# Patient Record
Sex: FEMALE | Race: WHITE | URBAN - METROPOLITAN AREA
[De-identification: names, ages, dates, MRNs, and addresses within clinical notes are randomized per-mention and may not be internally consistent; named-entity substitution may affect disease eponyms.]

---

## 2023-08-21 ENCOUNTER — HOSPITAL ENCOUNTER (OUTPATIENT)
Age: 28
Discharge: HOME | End: 2023-08-21
Attending: OBSTETRICS & GYNECOLOGY | Admitting: OBSTETRICS & GYNECOLOGY

## 2023-08-21 VITALS
DIASTOLIC BLOOD PRESSURE: 87 MMHG | BODY MASS INDEX: 25.44 KG/M2 | SYSTOLIC BLOOD PRESSURE: 133 MMHG | HEART RATE: 70 BPM | RESPIRATION RATE: 18 BRPM | WEIGHT: 149 LBS | TEMPERATURE: 98.3 F | HEIGHT: 64 IN

## 2023-08-21 PROCEDURE — 99284 EMERGENCY DEPT VISIT MOD MDM: CPT

## 2023-08-21 PROCEDURE — 59025 FETAL NON-STRESS TEST: CPT

## 2023-08-28 ENCOUNTER — HOSPITAL ENCOUNTER (INPATIENT)
Age: 28
LOS: 2 days | Discharge: HOME | End: 2023-08-30
Attending: OBSTETRICS & GYNECOLOGY | Admitting: OBSTETRICS & GYNECOLOGY
Payer: COMMERCIAL

## 2023-08-28 ENCOUNTER — ANESTHESIA EVENT (OUTPATIENT)
Dept: LABOR AND DELIVERY | Age: 28
End: 2023-08-28
Payer: COMMERCIAL

## 2023-08-28 ENCOUNTER — APPOINTMENT (OUTPATIENT)
Dept: LABOR AND DELIVERY | Age: 28
End: 2023-08-28
Payer: COMMERCIAL

## 2023-08-28 ENCOUNTER — ANESTHESIA (OUTPATIENT)
Dept: LABOR AND DELIVERY | Age: 28
End: 2023-08-28
Payer: COMMERCIAL

## 2023-08-28 PROBLEM — Z34.90 ENCOUNTER FOR INDUCTION OF LABOR: Status: ACTIVE | Noted: 2023-08-28

## 2023-08-28 LAB
ABO + RH BLD: NORMAL
BASE DEFICIT BLD-SCNC: 10.1 MMOL/L
BASE DEFICIT BLD-SCNC: 5.6 MMOL/L
BLOOD GROUP ANTIBODIES SERPL: NORMAL
ERYTHROCYTE [DISTWIDTH] IN BLOOD BY AUTOMATED COUNT: 12.7 % (ref 11.9–14.6)
HCO3 BLD-SCNC: 19 MMOL/L (ref 22–26)
HCO3 BLDV-SCNC: 19.8 MMOL/L (ref 23–28)
HCT VFR BLD AUTO: 38.1 % (ref 35.8–46.3)
HGB BLD-MCNC: 12.8 G/DL (ref 11.7–15.4)
MCH RBC QN AUTO: 30.5 PG (ref 26.1–32.9)
MCHC RBC AUTO-ENTMCNC: 33.6 G/DL (ref 31.4–35)
MCV RBC AUTO: 90.7 FL (ref 82–102)
NRBC # BLD: 0 K/UL (ref 0–0.2)
PCO2 BLDCO: 38 MMHG (ref 32–68)
PCO2 BLDCO: 53 MMHG (ref 32–68)
PH BLDCO: 7.16 (ref 7.15–7.38)
PH BLDCO: 7.33 (ref 7.15–7.38)
PLATELET # BLD AUTO: 248 K/UL (ref 150–450)
PMV BLD AUTO: 11 FL (ref 9.4–12.3)
PO2 BLDCO: 27 MMHG
PO2 BLDCO: 35 MMHG
RBC # BLD AUTO: 4.2 M/UL (ref 4.05–5.2)
SAO2 % BLD: 34.4 % (ref 95–98)
SAO2 % BLDV: 62.3 % (ref 65–88)
SERVICE CMNT-IMP: ABNORMAL
SERVICE CMNT-IMP: ABNORMAL
SPECIMEN EXP DATE BLD: NORMAL
SPECIMEN TYPE: ABNORMAL
SPECIMEN TYPE: ABNORMAL
WBC # BLD AUTO: 9.6 K/UL (ref 4.3–11.1)

## 2023-08-28 PROCEDURE — 86901 BLOOD TYPING SEROLOGIC RH(D): CPT

## 2023-08-28 PROCEDURE — 0KQM0ZZ REPAIR PERINEUM MUSCLE, OPEN APPROACH: ICD-10-PCS | Performed by: OBSTETRICS & GYNECOLOGY

## 2023-08-28 PROCEDURE — 7100000011 HC PHASE II RECOVERY - ADDTL 15 MIN

## 2023-08-28 PROCEDURE — 82803 BLOOD GASES ANY COMBINATION: CPT

## 2023-08-28 PROCEDURE — 51701 INSERT BLADDER CATHETER: CPT

## 2023-08-28 PROCEDURE — 7210000100 HC LABOR FEE PER 1 HR

## 2023-08-28 PROCEDURE — 6370000000 HC RX 637 (ALT 250 FOR IP): Performed by: OBSTETRICS & GYNECOLOGY

## 2023-08-28 PROCEDURE — 2580000003 HC RX 258: Performed by: OBSTETRICS & GYNECOLOGY

## 2023-08-28 PROCEDURE — 7220000101 HC DELIVERY VAGINAL/SINGLE

## 2023-08-28 PROCEDURE — 99465 NB RESUSCITATION: CPT

## 2023-08-28 PROCEDURE — 3E033VJ INTRODUCTION OF OTHER HORMONE INTO PERIPHERAL VEIN, PERCUTANEOUS APPROACH: ICD-10-PCS | Performed by: OBSTETRICS & GYNECOLOGY

## 2023-08-28 PROCEDURE — 6360000002 HC RX W HCPCS: Performed by: NURSE ANESTHETIST, CERTIFIED REGISTERED

## 2023-08-28 PROCEDURE — 6360000002 HC RX W HCPCS: Performed by: OBSTETRICS & GYNECOLOGY

## 2023-08-28 PROCEDURE — 7100000010 HC PHASE II RECOVERY - FIRST 15 MIN

## 2023-08-28 PROCEDURE — 86850 RBC ANTIBODY SCREEN: CPT

## 2023-08-28 PROCEDURE — 1100000000 HC RM PRIVATE

## 2023-08-28 PROCEDURE — 85027 COMPLETE CBC AUTOMATED: CPT

## 2023-08-28 PROCEDURE — 36600 WITHDRAWAL OF ARTERIAL BLOOD: CPT

## 2023-08-28 PROCEDURE — 00HU33Z INSERTION OF INFUSION DEVICE INTO SPINAL CANAL, PERCUTANEOUS APPROACH: ICD-10-PCS | Performed by: ANESTHESIOLOGY

## 2023-08-28 PROCEDURE — 3700000025 EPIDURAL BLOCK: Performed by: ANESTHESIOLOGY

## 2023-08-28 PROCEDURE — 86900 BLOOD TYPING SEROLOGIC ABO: CPT

## 2023-08-28 PROCEDURE — 2500000003 HC RX 250 WO HCPCS: Performed by: NURSE ANESTHETIST, CERTIFIED REGISTERED

## 2023-08-28 PROCEDURE — 10907ZC DRAINAGE OF AMNIOTIC FLUID, THERAPEUTIC FROM PRODUCTS OF CONCEPTION, VIA NATURAL OR ARTIFICIAL OPENING: ICD-10-PCS | Performed by: OBSTETRICS & GYNECOLOGY

## 2023-08-28 RX ORDER — LANOLIN
CREAM (ML) TOPICAL PRN
Status: DISCONTINUED | OUTPATIENT
Start: 2023-08-28 | End: 2023-08-30 | Stop reason: HOSPADM

## 2023-08-28 RX ORDER — CARBOPROST TROMETHAMINE 250 UG/ML
250 INJECTION, SOLUTION INTRAMUSCULAR PRN
Status: DISCONTINUED | OUTPATIENT
Start: 2023-08-28 | End: 2023-08-28

## 2023-08-28 RX ORDER — DOCUSATE SODIUM 100 MG/1
100 CAPSULE, LIQUID FILLED ORAL 2 TIMES DAILY
Status: DISCONTINUED | OUTPATIENT
Start: 2023-08-28 | End: 2023-08-28

## 2023-08-28 RX ORDER — OXYCODONE HYDROCHLORIDE 5 MG/1
10 TABLET ORAL EVERY 4 HOURS PRN
Status: DISCONTINUED | OUTPATIENT
Start: 2023-08-28 | End: 2023-08-30 | Stop reason: HOSPADM

## 2023-08-28 RX ORDER — METHYLERGONOVINE MALEATE 0.2 MG/ML
200 INJECTION INTRAVENOUS PRN
Status: DISCONTINUED | OUTPATIENT
Start: 2023-08-28 | End: 2023-08-28

## 2023-08-28 RX ORDER — OXYCODONE HYDROCHLORIDE 5 MG/1
5 TABLET ORAL EVERY 4 HOURS PRN
Status: DISCONTINUED | OUTPATIENT
Start: 2023-08-28 | End: 2023-08-30 | Stop reason: HOSPADM

## 2023-08-28 RX ORDER — MISOPROSTOL 200 UG/1
800 TABLET ORAL PRN
Status: DISCONTINUED | OUTPATIENT
Start: 2023-08-28 | End: 2023-08-28

## 2023-08-28 RX ORDER — SODIUM CHLORIDE, SODIUM LACTATE, POTASSIUM CHLORIDE, AND CALCIUM CHLORIDE .6; .31; .03; .02 G/100ML; G/100ML; G/100ML; G/100ML
500 INJECTION, SOLUTION INTRAVENOUS PRN
Status: DISCONTINUED | OUTPATIENT
Start: 2023-08-28 | End: 2023-08-28

## 2023-08-28 RX ORDER — HYDROCORTISONE ACETATE PRAMOXINE HCL 2.5; 1 G/100G; G/100G
CREAM TOPICAL 3 TIMES DAILY
Status: DISCONTINUED | OUTPATIENT
Start: 2023-08-28 | End: 2023-08-30 | Stop reason: HOSPADM

## 2023-08-28 RX ORDER — TRANEXAMIC ACID 10 MG/ML
1000 INJECTION, SOLUTION INTRAVENOUS
Status: DISCONTINUED | OUTPATIENT
Start: 2023-08-28 | End: 2023-08-28

## 2023-08-28 RX ORDER — ONDANSETRON 2 MG/ML
4 INJECTION INTRAMUSCULAR; INTRAVENOUS EVERY 6 HOURS PRN
Status: DISCONTINUED | OUTPATIENT
Start: 2023-08-28 | End: 2023-08-30 | Stop reason: HOSPADM

## 2023-08-28 RX ORDER — ACETAMINOPHEN 500 MG
1000 TABLET ORAL EVERY 6 HOURS
Status: DISCONTINUED | OUTPATIENT
Start: 2023-08-28 | End: 2023-08-30 | Stop reason: HOSPADM

## 2023-08-28 RX ORDER — ONDANSETRON 2 MG/ML
4 INJECTION INTRAMUSCULAR; INTRAVENOUS EVERY 6 HOURS PRN
Status: DISCONTINUED | OUTPATIENT
Start: 2023-08-28 | End: 2023-08-28

## 2023-08-28 RX ORDER — FAMOTIDINE 20 MG/1
20 TABLET, FILM COATED ORAL 2 TIMES DAILY PRN
Status: DISCONTINUED | OUTPATIENT
Start: 2023-08-28 | End: 2023-08-28

## 2023-08-28 RX ORDER — SODIUM CHLORIDE 9 MG/ML
INJECTION, SOLUTION INTRAVENOUS PRN
Status: DISCONTINUED | OUTPATIENT
Start: 2023-08-28 | End: 2023-08-30 | Stop reason: HOSPADM

## 2023-08-28 RX ORDER — SODIUM CHLORIDE 0.9 % (FLUSH) 0.9 %
5-40 SYRINGE (ML) INJECTION EVERY 12 HOURS SCHEDULED
Status: DISCONTINUED | OUTPATIENT
Start: 2023-08-28 | End: 2023-08-28

## 2023-08-28 RX ORDER — SODIUM CHLORIDE, SODIUM LACTATE, POTASSIUM CHLORIDE, CALCIUM CHLORIDE 600; 310; 30; 20 MG/100ML; MG/100ML; MG/100ML; MG/100ML
INJECTION, SOLUTION INTRAVENOUS CONTINUOUS
Status: DISCONTINUED | OUTPATIENT
Start: 2023-08-28 | End: 2023-08-30 | Stop reason: HOSPADM

## 2023-08-28 RX ORDER — SODIUM CHLORIDE, SODIUM LACTATE, POTASSIUM CHLORIDE, CALCIUM CHLORIDE 600; 310; 30; 20 MG/100ML; MG/100ML; MG/100ML; MG/100ML
INJECTION, SOLUTION INTRAVENOUS CONTINUOUS
Status: DISCONTINUED | OUTPATIENT
Start: 2023-08-28 | End: 2023-08-28

## 2023-08-28 RX ORDER — LIDOCAINE HYDROCHLORIDE 20 MG/ML
INJECTION, SOLUTION EPIDURAL; INFILTRATION; INTRACAUDAL; PERINEURAL PRN
Status: DISCONTINUED | OUTPATIENT
Start: 2023-08-28 | End: 2023-08-28 | Stop reason: SDUPTHER

## 2023-08-28 RX ORDER — ROPIVACAINE HYDROCHLORIDE 2 MG/ML
INJECTION, SOLUTION EPIDURAL; INFILTRATION; PERINEURAL CONTINUOUS PRN
Status: DISCONTINUED | OUTPATIENT
Start: 2023-08-28 | End: 2023-08-28 | Stop reason: SDUPTHER

## 2023-08-28 RX ORDER — SODIUM CHLORIDE, SODIUM LACTATE, POTASSIUM CHLORIDE, AND CALCIUM CHLORIDE .6; .31; .03; .02 G/100ML; G/100ML; G/100ML; G/100ML
1000 INJECTION, SOLUTION INTRAVENOUS PRN
Status: DISCONTINUED | OUTPATIENT
Start: 2023-08-28 | End: 2023-08-28

## 2023-08-28 RX ORDER — SODIUM CHLORIDE 9 MG/ML
INJECTION, SOLUTION INTRAVENOUS PRN
Status: DISCONTINUED | OUTPATIENT
Start: 2023-08-28 | End: 2023-08-28

## 2023-08-28 RX ORDER — SODIUM CHLORIDE 0.9 % (FLUSH) 0.9 %
5-40 SYRINGE (ML) INJECTION PRN
Status: DISCONTINUED | OUTPATIENT
Start: 2023-08-28 | End: 2023-08-28

## 2023-08-28 RX ORDER — DOCUSATE SODIUM 100 MG/1
100 CAPSULE, LIQUID FILLED ORAL 2 TIMES DAILY
Status: DISCONTINUED | OUTPATIENT
Start: 2023-08-28 | End: 2023-08-30 | Stop reason: HOSPADM

## 2023-08-28 RX ORDER — IBUPROFEN 600 MG/1
600 TABLET ORAL EVERY 6 HOURS
Status: DISCONTINUED | OUTPATIENT
Start: 2023-08-28 | End: 2023-08-30 | Stop reason: HOSPADM

## 2023-08-28 RX ORDER — FENTANYL CITRATE 50 UG/ML
INJECTION, SOLUTION INTRAMUSCULAR; INTRAVENOUS PRN
Status: DISCONTINUED | OUTPATIENT
Start: 2023-08-28 | End: 2023-08-28 | Stop reason: SDUPTHER

## 2023-08-28 RX ORDER — SODIUM CHLORIDE 0.9 % (FLUSH) 0.9 %
5-40 SYRINGE (ML) INJECTION PRN
Status: DISCONTINUED | OUTPATIENT
Start: 2023-08-28 | End: 2023-08-30 | Stop reason: HOSPADM

## 2023-08-28 RX ORDER — SODIUM CHLORIDE 0.9 % (FLUSH) 0.9 %
5-40 SYRINGE (ML) INJECTION EVERY 12 HOURS SCHEDULED
Status: DISCONTINUED | OUTPATIENT
Start: 2023-08-28 | End: 2023-08-30 | Stop reason: HOSPADM

## 2023-08-28 RX ADMIN — OXYCODONE HYDROCHLORIDE 5 MG: 5 TABLET ORAL at 18:06

## 2023-08-28 RX ADMIN — SODIUM CHLORIDE, POTASSIUM CHLORIDE, SODIUM LACTATE AND CALCIUM CHLORIDE 1000 ML: 600; 310; 30; 20 INJECTION, SOLUTION INTRAVENOUS at 09:55

## 2023-08-28 RX ADMIN — MAGNESIUM SULFATE 1 G: 1 CRYSTAL ORAL; TOPICAL at 18:07

## 2023-08-28 RX ADMIN — DOCUSATE SODIUM 100 MG: 100 CAPSULE, LIQUID FILLED ORAL at 21:23

## 2023-08-28 RX ADMIN — LIDOCAINE HYDROCHLORIDE 3 ML: 20 INJECTION, SOLUTION EPIDURAL; INFILTRATION; INTRACAUDAL; PERINEURAL at 14:17

## 2023-08-28 RX ADMIN — OXYTOCIN 1 MILLI-UNITS/MIN: 10 INJECTION INTRAVENOUS at 08:12

## 2023-08-28 RX ADMIN — ROPIVACAINE HYDROCHLORIDE 8 ML/HR: 2 INJECTION, SOLUTION EPIDURAL; INFILTRATION; PERINEURAL at 10:25

## 2023-08-28 RX ADMIN — ACETAMINOPHEN 1000 MG: 500 TABLET, FILM COATED ORAL at 21:23

## 2023-08-28 RX ADMIN — WITCH HAZEL: 500 SOLUTION RECTAL; TOPICAL at 18:07

## 2023-08-28 RX ADMIN — SODIUM CHLORIDE, POTASSIUM CHLORIDE, SODIUM LACTATE AND CALCIUM CHLORIDE: 600; 310; 30; 20 INJECTION, SOLUTION INTRAVENOUS at 08:15

## 2023-08-28 RX ADMIN — HYDROCORTISONE ACETATE PRAMOXINE HCL: 2.5; 1 CREAM TOPICAL at 21:23

## 2023-08-28 RX ADMIN — Medication 166.7 ML: at 15:11

## 2023-08-28 RX ADMIN — FENTANYL CITRATE 100 MCG: 50 INJECTION, SOLUTION INTRAMUSCULAR; INTRAVENOUS at 14:17

## 2023-08-28 RX ADMIN — IBUPROFEN 600 MG: 600 TABLET ORAL at 18:06

## 2023-08-28 RX ADMIN — Medication: at 18:06

## 2023-08-28 RX ADMIN — OXYCODONE HYDROCHLORIDE 5 MG: 5 TABLET ORAL at 22:38

## 2023-08-28 ASSESSMENT — PAIN DESCRIPTION - LOCATION: LOCATION: PERINEUM;RECTUM

## 2023-08-28 ASSESSMENT — PAIN SCALES - GENERAL: PAINLEVEL_OUTOF10: 4

## 2023-08-28 NOTE — PROGRESS NOTES
SBAR OUT Report: Mother    Verbal report given to Anabela Beaver RN on this patient, who is now [facility] ([facility] for routine progression of patient care. The patient is not wearing a green \"Anesthesia-Duramorph\" band. Report consisted of patient's Situation, Background, Assessment and Recommendations (SBAR).  ID bands were compared with the identification form, and verified with the patient and receiving nurse. Information from the Nurse Handoff Report, Adult Overview, Intake/Output, MAR, and Recent Results and the [place] Report was reviewed with the receiving nurse; opportunity for questions and clarification provided.

## 2023-08-28 NOTE — L&D DELIVERY NOTE
Bean Alcala [[id number]]      Labor Events     Labor: No   Steroids: None  Cervical Ripening Date/Time:      Antibiotics Received during Labor: No  Rupture Date/Time:  23 08:15:00   Rupture Type: AROM  Fluid Color: Clear  Fluid Odor: None  Fluid Volume:  Moderate  Induction: AROM, Oxytocin  Augmentation: AROM  Labor Complications: None              Anesthesia    Method: Epidural       Labor Event Times      Labor onset date/time:  23 11:10:00     Dilation complete date/time:  23 12:15:00 EDT     Start pushing date/time:  2023 12:51:00   Decision date/time (emergent ):            Labor Length    1st stage: 1h 05m  2nd stage: 2h 51m  3rd stage: 0h 04m       Delivery Details      Delivery Date: 23 Delivery Time: 15:06:16   Delivery Type: Vaginal, Spontaneous              [place] Presentation    Presentation: Vertex  Position: Left  _: Occiput  _: Anterior       Shoulder Dystocia    Shoulder Dystocia Present?: No       Assisted Delivery Details    Forceps Attempted?: No  Vacuum Extractor Attempted?: No                           Cord    Vessels: 3 Vessels  Complications: None  Delayed Cord Clamping?: No  Cord Clamped Date/Time: 2023 15:06:00  Cord Blood Disposition: Lab  Gases Sent?: Yes              Placenta    Date/Time: 2023 15:11:00  Removal: Expressed  Appearance: Intact  Disposition: Discarded       Lacerations    Episiotomy: Median  Perineal Lacerations: 2nd  Other Lacerations: no non-perineal laceration  Number of Repair Packets: 2       Vaginal Counts    Initial Count Personnel: INESSA ROLDAN CST  Initial Count Verified By: Jason Ortiz  Intial Sponge Count: Correct Intial Needles Count: Correct Intial Instruments Count: Correct   Final Sponges Count: Correct Final Needles  Count: Correct Final Instruments Count: Correct   Final Count Personnel: INESSA ROLDAN CST  Final Count Verified By: Jason Ortiz  Accurate Final Count?: Yes

## 2023-08-28 NOTE — H&P
Weight: 149 lb (67.6 kg)   Height: 5' 4\" (1.626 m)        Physical Exam:  Patient without distress. Heart: Regular rate and rhythm  Lung: clear to auscultation throughout lung fields, no wheezes, no rales, no rhonchi, and normal respiratory effort  Back: costovertebral angle tenderness absent  Abdomen: soft, nontender  Fundus: soft and non tender  Perineum: blood absent, amniotic fluid absent  Cervical Exam: 2 cm dilated    70% effaced    -2 station    Lower Extremities:  - Edema No  Membranes:  Intact    Fetal Heart Rate: Reactive    Prenatal Labs:   No results found for: ABORH, RUBELLAEXT, GRBSEXT, HBSAGEXT, HIVEXT, RPREXT, GONNOEXT, CHLAMEXT      Assessment/Plan:     Active Problems:    * No active hospital problems. *  Resolved Problems:    * No resolved hospital problems. *       Plan: Admit for Reassuring fetal status, Continue plan for vaginal delivery. Group B Strep was negative.

## 2023-08-28 NOTE — PROGRESS NOTES
Yany Hightower at bedside at [phone]. GARCÍA Klein at bedside at 1012    Assisted pt to sitting up on bedside at 1008. EFM temporarily discontinued due to maternal positioning for epidural.    Timeout completed at 1015 with MD, GARCÍA and myself at bedside. Test dose given at 1020. Negative reaction. Dose given at 1025. Pt assisted to lying back in left tilt position. EFM re-started. See anesthesia record for details. See vital sign flow sheet for BP. Tolerated procedure well.

## 2023-08-28 NOTE — ANESTHESIA PROCEDURE NOTES
Epidural Block    Patient location during procedure: OB  Start time: 8/28/2023 10:12 AM  End time: 8/28/2023 10:27 AM  Reason for block: labor epidural  Staffing  Performed: anesthesiologist   Anesthesiologist: Radha Solis MD  Epidural  Patient position: sitting  Prep: ChloraPrep  Patient monitoring: continuous pulse ox and frequent blood pressure checks  Approach: midline  Location: L3-4  Injection technique: SHARON air  Provider prep: mask and sterile gloves  Needle  Needle type: Tuohy   Needle gauge: 17 G  Needle length: 3.5 in  Needle insertion depth: 5 cm  Catheter type: end hole  Catheter size: 19 G  Catheter at skin depth: 10 cm  Test dose: negative (Negative test dose with 5 mL 1.5% lidocaine with 1:200,000 epinephrine at 1021)Catheter Secured: tegaderm and tape  Assessment  Sensory level: T10  Hemodynamics: stable  Attempts: 1  Outcomes: uncomplicated  Additional Notes  Procedure Time Out at 1015  Preanesthetic Checklist  Completed: patient identified, IV checked, risks and benefits discussed, surgical/procedural consents, equipment checked, pre-op evaluation, timeout performed, anesthesia consent given, oxygen available and monitors applied/VS acknowledged

## 2023-08-29 PROBLEM — K64.9 HEMORRHOID: Status: ACTIVE | Noted: 2023-08-29

## 2023-08-29 PROCEDURE — 6370000000 HC RX 637 (ALT 250 FOR IP): Performed by: OBSTETRICS & GYNECOLOGY

## 2023-08-29 PROCEDURE — 1100000000 HC RM PRIVATE

## 2023-08-29 RX ADMIN — DOCUSATE SODIUM 100 MG: 100 CAPSULE, LIQUID FILLED ORAL at 08:24

## 2023-08-29 RX ADMIN — ACETAMINOPHEN 1000 MG: 500 TABLET, FILM COATED ORAL at 03:00

## 2023-08-29 RX ADMIN — OXYCODONE HYDROCHLORIDE 5 MG: 5 TABLET ORAL at 08:23

## 2023-08-29 RX ADMIN — IBUPROFEN 600 MG: 600 TABLET ORAL at 08:24

## 2023-08-29 RX ADMIN — IBUPROFEN 600 MG: 600 TABLET ORAL at 17:20

## 2023-08-29 RX ADMIN — HYDROCORTISONE ACETATE PRAMOXINE HCL: 2.5; 1 CREAM TOPICAL at 08:24

## 2023-08-29 RX ADMIN — OXYCODONE HYDROCHLORIDE 5 MG: 5 TABLET ORAL at 04:23

## 2023-08-29 RX ADMIN — ACETAMINOPHEN 1000 MG: 500 TABLET, FILM COATED ORAL at 20:48

## 2023-08-29 RX ADMIN — DOCUSATE SODIUM 100 MG: 100 CAPSULE, LIQUID FILLED ORAL at 20:48

## 2023-08-29 RX ADMIN — ACETAMINOPHEN 1000 MG: 500 TABLET, FILM COATED ORAL at 14:25

## 2023-08-29 RX ADMIN — WITCH HAZEL: 500 SOLUTION RECTAL; TOPICAL at 20:48

## 2023-08-29 ASSESSMENT — PAIN SCALES - GENERAL: PAINLEVEL_OUTOF10: 3

## 2023-08-29 ASSESSMENT — PAIN DESCRIPTION - DESCRIPTORS: DESCRIPTORS: SORE

## 2023-08-29 ASSESSMENT — PAIN DESCRIPTION - LOCATION: LOCATION: RECTUM

## 2023-08-29 NOTE — PROGRESS NOTES
Postpartum Progress Note (VD)    Patient: Tomy Cintron MRN: [id number]  SSN: [id number]    YOB: 1995  Age: 29 y.o. Sex: female      Subjective:     Postpartum Day: 1     Delivery: Spont. vaginal delivery    The patient feels pain from the hemorrhoid. The patient Patient was awake and alert. emotional concerns. Pain is  moderately controlled with current medications. Urinary output is adequate. Voiding spontaneous. The patient is ambulating well. The patient is tolerating a normal diet. Flatus has been passed. The baby iswell. Baby is feeding via breast.    Objective:      No data found. General:    alert   Bowel Sounds:  active   Lochia:  appropriate   Uterine Fundus:   firm   Fundus Location:  0   Perineum:  no significant drainage   DVT Evaluation:  No evidence of DVT seen on physical exam.     Lab/Data Review:      Assessment:     Status post: Doing well postpartum vaginal delivery     Plan:     - Postpartum care discussed including diet, ambulation, and actvitiy restrictions. - asked about GI consult, discussed treatment after delivery they stopped as pain caused greater issues then the hemorrhoid. - Discharge planning for PPD #1  - .     Akil Dutta MD

## 2023-08-29 NOTE — CARE COORDINATION
Chart reviewed - first time parent. SW met with patient to complete initial assessment.  provided education on [facility] Postpartum [place] Home Visit. Family would like to participate in program.  Referral will be made at discharge. Patient given informational packet on  mood & anxiety disorders (resources/education). Family denies any additional needs from  at this time. Family has 's contact information should any needs/questions arise.     PATRICE Arias, [address] [place] [address]   [phone]

## 2023-08-30 VITALS
SYSTOLIC BLOOD PRESSURE: 99 MMHG | RESPIRATION RATE: 16 BRPM | HEART RATE: 55 BPM | HEIGHT: 64 IN | BODY MASS INDEX: 25.44 KG/M2 | DIASTOLIC BLOOD PRESSURE: 61 MMHG | WEIGHT: 149 LBS | OXYGEN SATURATION: 98 % | TEMPERATURE: 98 F

## 2023-08-30 PROCEDURE — 6370000000 HC RX 637 (ALT 250 FOR IP): Performed by: OBSTETRICS & GYNECOLOGY

## 2023-08-30 RX ORDER — HYDROCODONE BITARTRATE AND ACETAMINOPHEN 5; 325 MG/1; MG/1
1 TABLET ORAL EVERY 4 HOURS PRN
Qty: 10 TABLET | Refills: 0 | Status: SHIPPED | OUTPATIENT
Start: 2023-08-30 | End: 2023-09-06

## 2023-08-30 RX ORDER — IBUPROFEN 600 MG/1
600 TABLET ORAL EVERY 6 HOURS
Qty: 30 TABLET | Refills: 1 | Status: SHIPPED | OUTPATIENT
Start: 2023-08-30

## 2023-08-30 RX ORDER — PSEUDOEPHEDRINE HCL 30 MG
100 TABLET ORAL 2 TIMES DAILY
Qty: 60 CAPSULE | Refills: 1 | Status: SHIPPED | OUTPATIENT
Start: 2023-08-30

## 2023-08-30 RX ORDER — HYDROCORTISONE ACETATE PRAMOXINE HCL 2.5; 1 G/100G; G/100G
CREAM TOPICAL 3 TIMES DAILY
Qty: 1 EACH | Refills: 2 | Status: SHIPPED | OUTPATIENT
Start: 2023-08-30

## 2023-08-30 RX ADMIN — DOCUSATE SODIUM 100 MG: 100 CAPSULE, LIQUID FILLED ORAL at 08:49

## 2023-08-30 RX ADMIN — IBUPROFEN 600 MG: 600 TABLET ORAL at 08:48

## 2023-08-30 RX ADMIN — HYDROCORTISONE ACETATE PRAMOXINE HCL: 2.5; 1 CREAM TOPICAL at 08:48

## 2023-08-30 ASSESSMENT — PAIN SCALES - GENERAL: PAINLEVEL_OUTOF10: 3

## 2023-08-30 NOTE — CARE COORDINATION
Referral made to [facility]  home visit program.    MALLORY Salcedo-ALESSANDRO, [facility]   [phone]

## 2023-08-30 NOTE — PROGRESS NOTES
During pt reassessment, pt requested that this evening we only come in for her vital signs and necessary baby care, baby vital signs, labs and assessments. Pt requested that we allow her to call out when medications are needed and informed this RN that check-in's and rounding would not be necessary tonight.

## 2023-08-30 NOTE — DISCHARGE INSTRUCTIONS
to make and go to all appointments, and call your doctor if you are having problems. It's also a good idea to know your test results and keep a list of the medicines you take. How can you care for yourself at home? Sleep or rest when your baby sleeps. Get help with household chores from family or friends, if you can. Don't try to do it all yourself. If you have hemorrhoids or swelling or pain around the opening of your vagina, try using cold and heat. You can put ice or a cold pack on the area for 10 to 20 minutes at a time. Put a thin cloth between the ice and your skin. Also try sitting in a few inches of warm water (sitz bath) 3 times a day and after bowel movements. Take pain medicines exactly as directed. If the doctor gave you a prescription medicine for pain, take it as prescribed. If you are not taking a prescription pain medicine, ask your doctor if you can take an over-the-counter medicine. Eat more fiber to avoid constipation. Include foods such as whole-grain breads and cereals, raw vegetables, raw and dried fruits, and beans. Drink plenty of fluids. If you have kidney, heart, or liver disease and have to limit fluids, talk with your doctor before you increase the amount of fluids you drink. Do not rinse inside your vagina with fluids (douche). If you have stitches, keep the area clean by pouring or spraying warm water over the area outside your vagina and anus after you use the toilet. Keep a list of questions to ask your doctor or midwife. Your questions might be about:  Changes in your breasts, such as lumps or soreness. When to expect your menstrual period to start again. What form of birth control is best for you. Weight you have put on during the pregnancy. Exercise options. What foods and drinks are best for you, especially if you are breastfeeding. Problems you might be having with breastfeeding. When you can have sex. You may want to talk about lubricants for your vagina.   Any

## 2023-08-30 NOTE — DISCHARGE SUMMARY
Patient ID:  Christopher Nam  [id number]  13 y.o.  1995    Admit Date: 2023    Discharge Date: 2023     Admitting Physician: Andreina Elena MD     Admission Diagnoses: Encounter for induction of labor [Z34.90]    Discharge Diagnoses: Same as above with   at   on   by   producing a viable  . Information for the patient's :  Hilton Cancel Girl Queenie Locus [[id number]]          Additional Diagnoses: none. No components found for: OBEXTABO/RH, OBEXTABSCRN, OBEXTHBSAG, OBEXTHIV, OBEXTRUBELLA, OBEXTRPR, OBEXTGONORR, OBEXTCHLAM, OBEXTGRBS    Significant Diagnostic Studies: none            History of Present Illness:   OB History          2    Para   1    Term   1       0    AB   1    Living   1         SAB   1    IAB   0    Ectopic   0    Molar   0    Multiple   0    Live Births   1              Admitted for IOL. Pregnancy complicated by Rh -.  Baby Rh neg. Hospital Course:   Patient was admitted and underwent artificial rupture of membranes with Clear fluid noted. She underwent pitocin induction. Progressed to complete cervical dilatation with vaginal delivery. 2nd degree Laceration repair using 3-0 rapid, 2-0 vicryl  The remainder of the postpartum course was unremarkable. Most pain from the hemorrhoid. She was deemed stable for discharge home on day 2. Patient Instructions:   Current Discharge Medication List        START taking these medications    Details   HYDROcodone-acetaminophen (NORCO) 5-325 MG per tablet Take 1 tablet by mouth every 4 hours as needed for Pain for up to 7 days. Intended supply: 3 days.  Take lowest dose possible to manage pain Max Daily Amount: 6 tablets  Qty: 10 tablet, Refills: 0    Comments: Reduce doses taken as pain becomes manageable  Associated Diagnoses:  (spontaneous vaginal delivery)      Hydrocort-Pramoxine, Perianal, (ANALPRAM-HC) 2.5-1 % rectal cream Place rectally 3 times daily  Qty: 1 each, Refills: 2      ibuprofen (ADVIL;MOTRIN) 600 MG

## 2023-09-07 ENCOUNTER — HOSPITAL ENCOUNTER (OUTPATIENT)
Dept: LACTATION | Age: 28
Discharge: HOME | End: 2023-09-10
Payer: COMMERCIAL

## 2023-09-07 PROCEDURE — S9443 LACTATION CLASS: HCPCS

## 2023-09-07 NOTE — LACTATION NOTE
Outpatient Feeding Plan for Breastfeeding  [phone]  Patient: Marita JEAN  Gestational Age: 41w 6d  Diagnosis:  V 24.1/Z39.1 Inconsistent latching. Eddy Temple  Pediatrician Dr. Anabel Han Time:  1200  Stop Time:  0059    Good, active breastfeeding is when baby is alert, tugging the nipple in long, deep pulls, and you can hear swallows every 1-2 sucks. By now Mom's milk should be in. Brief, light or shallow sucks or short rapid sucks without frequent swallows should not be considered a full breastfeeding. 1. Complete the following mouth exercises prior to feeding:  Gum Massage, Cheek Stretch, Lip Stretch, and Non-nutritive Sucking    2. Put the baby to the breast on-demand, up to 8 times per day for ~10 minutes per side. Finish first side before offering other side. Estimated Needs:  Baby needs 20-22 oz of breast milk/formula per day based on 8 feedings per day. Baby needs at least 75 ml/2.5 oz of breast milk/formula per feeding. The more often baby eats the less volume they need per feeding. Date Weight Comments   8-28-23 8-6 Birthweight   8-30-23 8-0.6 Discharge Weight   9-1-23 7-13 At [facility]   9-7-23 8-2.4 Naked At [facility] OP Lactation      Average weight gain for the first 3 months is 1oz per day. Minimum weight gain in the first 3 months is 0.5 oz per day. Typically regaining to birth weight by 2 weeks. Date Side Position Time Before Wt. After Wt Total   9-7-23 R Laid back  1207 8 min  3716 3816 100 ml     Baby last ate at 0900.  5 minutes on each side. Exclusively breastfeeding. Very short feeds. Offers both sides. Baby girl Kenney Munoz was alert and ready to nurse. Overall weight gain is ~1 oz/day with exclusive breastfeeding. Per mom latch and feedings have been difficult and inconsistent. Baby does a lot of on and off at breast and has short feedings per mom.   Digital warm up reveals
still get plenty of volume. Plan:  Continue with on-demand feeding. May be able to stretch out night feeds since close to birthweight as long as feeding frequently during the day. If consistently doing 1 sided feedings. Suggest passively pumping with haaka/Mary curve on unused side a few times per day. Follow efficiency and ease of latch at breast.     Follow-up: To Ped later today. If mainly doing 1 sided feedings, suggest another weight check in 2 weeks. Will call 9-13. Call as needed before.        Sincerely,      Flex Douglas, [address] [facility], [address] [facility]

## 2023-11-22 ENCOUNTER — TELEPHONE (OUTPATIENT)
Dept: MOTHER INFANT UNIT | Age: 28
End: 2023-11-22

## 2023-11-22 NOTE — TELEPHONE ENCOUNTER
Mom called lactation department to see if had any tips as she states baby is having hard time wanting to bottle feed. Baby is 3 months old. Mom mainly breastfeeds but wants baby to have the ability to be able to take a bottle. Reviewed some types of bottles to try as well as many ideas to try at home for introducing baby to bottle as far as position, time of day, motion, person to offer, etc. If those things do no work told her she could call back and talk to June here as main outpatient lactation consultant or can discuss w/ [facility]. She has seen [facility] in the past and briefly discussed this w/ them already when there due to baby having some issues w/ head/neck (seeing chiropracter through there). Reviewed what not to do to create oral aversion as mom feels she is already starting to see some signs of that. Encouraged mom to see out pediatrician or [facility] regarding this if getting worse and/or not improving. Mom verbalized understanding.

## 2024-10-14 LAB
ABO, EXTERNAL RESULT: NORMAL
C. TRACHOMATIS, EXTERNAL RESULT: NEGATIVE
HEP B, EXTERNAL RESULT: NEGATIVE
HIV, EXTERNAL RESULT: NORMAL
N. GONORRHOEAE, EXTERNAL RESULT: NEGATIVE
RH FACTOR, EXTERNAL RESULT: NEGATIVE
RUBELLA TITER, EXTERNAL RESULT: NORMAL

## 2024-10-23 ENCOUNTER — TELEPHONE (OUTPATIENT)
Dept: OBGYN CLINIC | Age: 29
End: 2024-10-23

## 2024-10-23 NOTE — TELEPHONE ENCOUNTER
Patient called OB Line stating she would like to transfer OB care to our office. LVM asking patient for return call.

## 2024-11-19 ENCOUNTER — TELEPHONE (OUTPATIENT)
Dept: OBGYN CLINIC | Age: 29
End: 2024-11-19

## 2024-11-19 NOTE — TELEPHONE ENCOUNTER
Called patient after request for transfer of OB care.  Per [facility] we are declining transfer d/t ability to accommodate current OB patients.      I tried to leave a voice mail for patient however I got cut off.  Called back and left direct dial number for call back.

## 2024-11-21 ENCOUNTER — TELEPHONE (OUTPATIENT)
Dept: OBGYN CLINIC | Age: 29
End: 2024-11-21

## 2024-11-21 NOTE — TELEPHONE ENCOUNTER
Received voice mail from patient asking about OB transfer.  Per Dr. Colbert we are declining transfer right now so we can accommodate current patients.

## 2025-04-10 LAB — GBS, EXTERNAL RESULT: NEGATIVE

## 2025-04-23 ENCOUNTER — ANESTHESIA EVENT (OUTPATIENT)
Dept: LABOR AND DELIVERY | Age: 30
End: 2025-04-23
Payer: COMMERCIAL

## 2025-04-23 ENCOUNTER — HOSPITAL ENCOUNTER (INPATIENT)
Age: 30
LOS: 1 days | Discharge: HOME | End: 2025-04-24
Attending: OBSTETRICS & GYNECOLOGY | Admitting: OBSTETRICS & GYNECOLOGY
Payer: COMMERCIAL

## 2025-04-23 ENCOUNTER — ANESTHESIA (OUTPATIENT)
Dept: LABOR AND DELIVERY | Age: 30
End: 2025-04-23
Payer: COMMERCIAL

## 2025-04-23 PROBLEM — Z37.9 NORMAL LABOR: Status: ACTIVE | Noted: 2025-04-23

## 2025-04-23 LAB
ABO + RH BLD: NORMAL
BLOOD GROUP ANTIBODIES SERPL: NORMAL
ERYTHROCYTE [DISTWIDTH] IN BLOOD BY AUTOMATED COUNT: 13.7 % (ref 11.9–14.6)
HCT VFR BLD AUTO: 38.7 % (ref 35.8–46.3)
HGB BLD-MCNC: 12.7 G/DL (ref 11.7–15.4)
MCH RBC QN AUTO: 29.4 PG (ref 26.1–32.9)
MCHC RBC AUTO-ENTMCNC: 32.8 G/DL (ref 31.4–35)
MCV RBC AUTO: 89.6 FL (ref 82–102)
NRBC # BLD: 0 K/UL (ref 0–0.2)
PLATELET # BLD AUTO: 232 K/UL (ref 150–450)
PMV BLD AUTO: 10.7 FL (ref 9.4–12.3)
RBC # BLD AUTO: 4.32 M/UL (ref 4.05–5.2)
SPECIMEN EXP DATE BLD: NORMAL
T PALLIDUM AB SER QL IA: NONREACTIVE
WBC # BLD AUTO: 14.4 K/UL (ref 4.3–11.1)

## 2025-04-23 PROCEDURE — 0KQM0ZZ REPAIR PERINEUM MUSCLE, OPEN APPROACH: ICD-10-PCS | Performed by: OBSTETRICS & GYNECOLOGY

## 2025-04-23 PROCEDURE — 1100000000 HC RM PRIVATE

## 2025-04-23 PROCEDURE — 2500000003 HC RX 250 WO HCPCS: Performed by: OBSTETRICS & GYNECOLOGY

## 2025-04-23 PROCEDURE — 7100000010 HC PHASE II RECOVERY - FIRST 15 MIN

## 2025-04-23 PROCEDURE — 99283 EMERGENCY DEPT VISIT LOW MDM: CPT

## 2025-04-23 PROCEDURE — 3E0R3BZ INTRODUCTION OF ANESTHETIC AGENT INTO SPINAL CANAL, PERCUTANEOUS APPROACH: ICD-10-PCS | Performed by: ANESTHESIOLOGY

## 2025-04-23 PROCEDURE — 86901 BLOOD TYPING SEROLOGIC RH(D): CPT

## 2025-04-23 PROCEDURE — 85027 COMPLETE CBC AUTOMATED: CPT

## 2025-04-23 PROCEDURE — 2580000003 HC RX 258: Performed by: OBSTETRICS & GYNECOLOGY

## 2025-04-23 PROCEDURE — 7220000101 HC DELIVERY VAGINAL/SINGLE

## 2025-04-23 PROCEDURE — 7210000100 HC LABOR FEE PER 1 HR

## 2025-04-23 PROCEDURE — 51701 INSERT BLADDER CATHETER: CPT

## 2025-04-23 PROCEDURE — 6370000000 HC RX 637 (ALT 250 FOR IP): Performed by: OBSTETRICS & GYNECOLOGY

## 2025-04-23 PROCEDURE — 3700000025 EPIDURAL BLOCK: Performed by: ANESTHESIOLOGY

## 2025-04-23 PROCEDURE — 6360000002 HC RX W HCPCS: Performed by: NURSE ANESTHETIST, CERTIFIED REGISTERED

## 2025-04-23 PROCEDURE — 10907ZC DRAINAGE OF AMNIOTIC FLUID, THERAPEUTIC FROM PRODUCTS OF CONCEPTION, VIA NATURAL OR ARTIFICIAL OPENING: ICD-10-PCS | Performed by: OBSTETRICS & GYNECOLOGY

## 2025-04-23 PROCEDURE — 6360000002 HC RX W HCPCS: Performed by: OBSTETRICS & GYNECOLOGY

## 2025-04-23 PROCEDURE — 2500000003 HC RX 250 WO HCPCS: Performed by: NURSE ANESTHETIST, CERTIFIED REGISTERED

## 2025-04-23 PROCEDURE — 86780 TREPONEMA PALLIDUM: CPT

## 2025-04-23 PROCEDURE — 7100000011 HC PHASE II RECOVERY - ADDTL 15 MIN

## 2025-04-23 PROCEDURE — 86850 RBC ANTIBODY SCREEN: CPT

## 2025-04-23 PROCEDURE — 86900 BLOOD TYPING SEROLOGIC ABO: CPT

## 2025-04-23 RX ORDER — OXYCODONE HYDROCHLORIDE 5 MG/1
10 TABLET ORAL EVERY 4 HOURS PRN
Status: DISCONTINUED | OUTPATIENT
Start: 2025-04-23 | End: 2025-04-25 | Stop reason: HOSPADM

## 2025-04-23 RX ORDER — SODIUM CHLORIDE 9 MG/ML
INJECTION, SOLUTION INTRAVENOUS PRN
Status: DISCONTINUED | OUTPATIENT
Start: 2025-04-23 | End: 2025-04-25 | Stop reason: HOSPADM

## 2025-04-23 RX ORDER — OXYCODONE HYDROCHLORIDE 5 MG/1
5 TABLET ORAL EVERY 4 HOURS PRN
Status: DISCONTINUED | OUTPATIENT
Start: 2025-04-23 | End: 2025-04-25 | Stop reason: HOSPADM

## 2025-04-23 RX ORDER — BUTORPHANOL TARTRATE 2 MG/ML
1 INJECTION, SOLUTION INTRAMUSCULAR; INTRAVENOUS
Status: DISCONTINUED | OUTPATIENT
Start: 2025-04-23 | End: 2025-04-23

## 2025-04-23 RX ORDER — TRANEXAMIC ACID 10 MG/ML
1000 INJECTION, SOLUTION INTRAVENOUS
Status: DISCONTINUED | OUTPATIENT
Start: 2025-04-23 | End: 2025-04-25 | Stop reason: HOSPADM

## 2025-04-23 RX ORDER — HYDROCORTISONE ACETATE PRAMOXINE HCL 2.5; 1 G/100G; G/100G
CREAM TOPICAL 3 TIMES DAILY
Status: DISCONTINUED | OUTPATIENT
Start: 2025-04-23 | End: 2025-04-25 | Stop reason: HOSPADM

## 2025-04-23 RX ORDER — ROPIVACAINE HYDROCHLORIDE 2 MG/ML
INJECTION, SOLUTION EPIDURAL; INFILTRATION; PERINEURAL
Status: DISCONTINUED | OUTPATIENT
Start: 2025-04-23 | End: 2025-04-23 | Stop reason: SDUPTHER

## 2025-04-23 RX ORDER — EPHEDRINE SULFATE/0.9% NACL/PF 50 MG/5 ML
SYRINGE (ML) INTRAVENOUS
Status: DISCONTINUED | OUTPATIENT
Start: 2025-04-23 | End: 2025-04-23 | Stop reason: SDUPTHER

## 2025-04-23 RX ORDER — ACETAMINOPHEN 325 MG/1
650 TABLET ORAL EVERY 4 HOURS PRN
Status: DISCONTINUED | OUTPATIENT
Start: 2025-04-23 | End: 2025-04-23

## 2025-04-23 RX ORDER — ONDANSETRON 4 MG/1
4 TABLET, ORALLY DISINTEGRATING ORAL EVERY 6 HOURS PRN
Status: DISCONTINUED | OUTPATIENT
Start: 2025-04-23 | End: 2025-04-25 | Stop reason: HOSPADM

## 2025-04-23 RX ORDER — DOCUSATE SODIUM 100 MG/1
100 CAPSULE, LIQUID FILLED ORAL 2 TIMES DAILY
Status: DISCONTINUED | OUTPATIENT
Start: 2025-04-23 | End: 2025-04-25 | Stop reason: HOSPADM

## 2025-04-23 RX ORDER — SODIUM CHLORIDE, SODIUM LACTATE, POTASSIUM CHLORIDE, AND CALCIUM CHLORIDE .6; .31; .03; .02 G/100ML; G/100ML; G/100ML; G/100ML
1000 INJECTION, SOLUTION INTRAVENOUS PRN
Status: DISCONTINUED | OUTPATIENT
Start: 2025-04-23 | End: 2025-04-23

## 2025-04-23 RX ORDER — ACETAMINOPHEN 500 MG
1000 TABLET ORAL EVERY 6 HOURS SCHEDULED
Status: DISCONTINUED | OUTPATIENT
Start: 2025-04-24 | End: 2025-04-25 | Stop reason: HOSPADM

## 2025-04-23 RX ORDER — SODIUM CHLORIDE, SODIUM LACTATE, POTASSIUM CHLORIDE, CALCIUM CHLORIDE 600; 310; 30; 20 MG/100ML; MG/100ML; MG/100ML; MG/100ML
INJECTION, SOLUTION INTRAVENOUS CONTINUOUS
Status: CANCELLED | OUTPATIENT
Start: 2025-04-23

## 2025-04-23 RX ORDER — TERBUTALINE SULFATE 1 MG/ML
0.25 INJECTION SUBCUTANEOUS
Status: DISCONTINUED | OUTPATIENT
Start: 2025-04-23 | End: 2025-04-23

## 2025-04-23 RX ORDER — IBUPROFEN 600 MG/1
600 TABLET, FILM COATED ORAL EVERY 6 HOURS PRN
Status: DISCONTINUED | OUTPATIENT
Start: 2025-04-23 | End: 2025-04-25 | Stop reason: HOSPADM

## 2025-04-23 RX ORDER — ONDANSETRON 2 MG/ML
4 INJECTION INTRAMUSCULAR; INTRAVENOUS EVERY 6 HOURS PRN
Status: DISCONTINUED | OUTPATIENT
Start: 2025-04-23 | End: 2025-04-25 | Stop reason: HOSPADM

## 2025-04-23 RX ORDER — LANOLIN
CREAM (ML) TOPICAL PRN
Status: DISCONTINUED | OUTPATIENT
Start: 2025-04-23 | End: 2025-04-25 | Stop reason: HOSPADM

## 2025-04-23 RX ORDER — MISOPROSTOL 200 UG/1
400 TABLET ORAL PRN
Status: DISCONTINUED | OUTPATIENT
Start: 2025-04-23 | End: 2025-04-25 | Stop reason: HOSPADM

## 2025-04-23 RX ORDER — DOCUSATE SODIUM 100 MG/1
100 CAPSULE, LIQUID FILLED ORAL 2 TIMES DAILY
Status: DISCONTINUED | OUTPATIENT
Start: 2025-04-23 | End: 2025-04-23 | Stop reason: SDUPTHER

## 2025-04-23 RX ORDER — ROPIVACAINE HYDROCHLORIDE 5 MG/ML
INJECTION, SOLUTION EPIDURAL; INFILTRATION; PERINEURAL
Status: DISCONTINUED | OUTPATIENT
Start: 2025-04-23 | End: 2025-04-23 | Stop reason: SDUPTHER

## 2025-04-23 RX ORDER — SODIUM CHLORIDE, SODIUM LACTATE, POTASSIUM CHLORIDE, CALCIUM CHLORIDE 600; 310; 30; 20 MG/100ML; MG/100ML; MG/100ML; MG/100ML
INJECTION, SOLUTION INTRAVENOUS CONTINUOUS
Status: DISCONTINUED | OUTPATIENT
Start: 2025-04-23 | End: 2025-04-23

## 2025-04-23 RX ORDER — SODIUM CHLORIDE 0.9 % (FLUSH) 0.9 %
5-40 SYRINGE (ML) INJECTION PRN
Status: DISCONTINUED | OUTPATIENT
Start: 2025-04-23 | End: 2025-04-23

## 2025-04-23 RX ORDER — LIDOCAINE HYDROCHLORIDE AND EPINEPHRINE 15; 5 MG/ML; UG/ML
INJECTION, SOLUTION EPIDURAL
Status: DISCONTINUED | OUTPATIENT
Start: 2025-04-23 | End: 2025-04-23 | Stop reason: SDUPTHER

## 2025-04-23 RX ORDER — SODIUM CHLORIDE 0.9 % (FLUSH) 0.9 %
5-40 SYRINGE (ML) INJECTION PRN
Status: DISCONTINUED | OUTPATIENT
Start: 2025-04-23 | End: 2025-04-25 | Stop reason: HOSPADM

## 2025-04-23 RX ORDER — SODIUM CHLORIDE 0.9 % (FLUSH) 0.9 %
5-40 SYRINGE (ML) INJECTION EVERY 12 HOURS SCHEDULED
Status: DISCONTINUED | OUTPATIENT
Start: 2025-04-23 | End: 2025-04-25 | Stop reason: HOSPADM

## 2025-04-23 RX ORDER — SODIUM CHLORIDE 9 MG/ML
INJECTION, SOLUTION INTRAVENOUS PRN
Status: DISCONTINUED | OUTPATIENT
Start: 2025-04-23 | End: 2025-04-23

## 2025-04-23 RX ORDER — ONDANSETRON 4 MG/1
4 TABLET, ORALLY DISINTEGRATING ORAL EVERY 6 HOURS PRN
Status: DISCONTINUED | OUTPATIENT
Start: 2025-04-23 | End: 2025-04-23

## 2025-04-23 RX ORDER — ONDANSETRON 2 MG/ML
4 INJECTION INTRAMUSCULAR; INTRAVENOUS EVERY 6 HOURS PRN
Status: DISCONTINUED | OUTPATIENT
Start: 2025-04-23 | End: 2025-04-23

## 2025-04-23 RX ORDER — SODIUM CHLORIDE, SODIUM LACTATE, POTASSIUM CHLORIDE, AND CALCIUM CHLORIDE .6; .31; .03; .02 G/100ML; G/100ML; G/100ML; G/100ML
500 INJECTION, SOLUTION INTRAVENOUS PRN
Status: DISCONTINUED | OUTPATIENT
Start: 2025-04-23 | End: 2025-04-23

## 2025-04-23 RX ORDER — SODIUM CHLORIDE 0.9 % (FLUSH) 0.9 %
5-40 SYRINGE (ML) INJECTION EVERY 12 HOURS SCHEDULED
Status: DISCONTINUED | OUTPATIENT
Start: 2025-04-23 | End: 2025-04-23

## 2025-04-23 RX ADMIN — HYDROCORTISONE ACETATE PRAMOXINE HCL: 2.5; 1 CREAM TOPICAL at 23:26

## 2025-04-23 RX ADMIN — SODIUM CHLORIDE, PRESERVATIVE FREE 10 ML: 5 INJECTION INTRAVENOUS at 23:31

## 2025-04-23 RX ADMIN — Medication 20 MG: at 18:30

## 2025-04-23 RX ADMIN — LIDOCAINE HYDROCHLORIDE,EPINEPHRINE BITARTRATE 5 ML: 15; .005 INJECTION, SOLUTION EPIDURAL; INFILTRATION; INTRACAUDAL; PERINEURAL at 18:24

## 2025-04-23 RX ADMIN — ACETAMINOPHEN 1000 MG: 500 TABLET, FILM COATED ORAL at 22:41

## 2025-04-23 RX ADMIN — ROPIVACAINE HYDROCHLORIDE 6 ML: 5 INJECTION, SOLUTION EPIDURAL; INFILTRATION; PERINEURAL at 18:27

## 2025-04-23 RX ADMIN — WITCH HAZEL: 500 SOLUTION RECTAL; TOPICAL at 22:44

## 2025-04-23 RX ADMIN — SODIUM CHLORIDE, SODIUM LACTATE, POTASSIUM CHLORIDE, AND CALCIUM CHLORIDE: .6; .31; .03; .02 INJECTION, SOLUTION INTRAVENOUS at 19:30

## 2025-04-23 RX ADMIN — DOCUSATE SODIUM 100 MG: 100 CAPSULE, LIQUID FILLED ORAL at 23:25

## 2025-04-23 RX ADMIN — ROPIVACAINE HYDROCHLORIDE 8 ML/HR: 2 INJECTION, SOLUTION EPIDURAL; INFILTRATION at 18:29

## 2025-04-23 RX ADMIN — OXYCODONE 5 MG: 5 TABLET ORAL at 22:42

## 2025-04-23 RX ADMIN — OXYTOCIN 87.3 MILLI-UNITS/MIN: 10 INJECTION, SOLUTION INTRAMUSCULAR; INTRAVENOUS at 21:45

## 2025-04-23 RX ADMIN — Medication 166.7 ML: at 21:34

## 2025-04-23 RX ADMIN — Medication 10 MG: at 18:33

## 2025-04-23 ASSESSMENT — PAIN SCALES - GENERAL
PAINLEVEL_OUTOF10: 3
PAINLEVEL_OUTOF10: 4

## 2025-04-23 ASSESSMENT — PAIN DESCRIPTION - LOCATION
LOCATION: PERINEUM
LOCATION: PERINEUM

## 2025-04-23 NOTE — PROGRESS NOTES
EPIDURAL PLACEMENT      Dr Steinberg at bedside at 1820. GARCÍA Butt at bedside at 1820    Assisted pt to sitting up on bedside at 1821.    Timeout completed at 1824 with GARCÍA CARRILLO and myself at bedside.    Test dose given at 1824. Negative reaction.    Dose given at 1826.    Pt assisted to lying back in left tilt position.    See anesthesia record for details.  See vital sign flow sheet for BP.    Tolerated procedure well.

## 2025-04-23 NOTE — PROGRESS NOTES
Pt to triage with c/o of contractions. Dr. Dale at bedside, SVE 6 cm. Admit for labor to 433. Anesthesia called for epidural.

## 2025-04-23 NOTE — H&P
Obstetrics History & Physical/OB ED note    Name: Dominga Alcala MRN: [id number]     YOB: 1995  Age: 30 y.o.  Sex: female      Reason for Presentation:  contractions/possible labor    HPI: Dominga Alcala is a 30 y.o.  female with Estimated Date of Delivery: 25 at 38w1d gestation. Her obstetrical history is significant for one previous full-term vaginal delivery.  Prenatal records reviewed.     Complaining of regular, painful contractions with pelvic pressure.     She reports good fetal movement. She denies vaginal bleeding. She denies leakage of fluid.      Past History:  OB History    Para Term  AB Living   3 1 1 0 1 1   SAB IAB Ectopic Molar Multiple Live Births   1 0 0 0 0 1      # Outcome Date GA Lbr Timo/2nd Weight Sex Type Anes PTL Lv   3 Current            2 Term 23 39w6d 01:05  02:51 3.81 kg (8 lb 6.4 oz) F Vag-Spont EPI N RITA   1 SAB              Past Medical History:   Diagnosis Date    Acne      Past Surgical History:   Procedure Laterality Date    WISDOM TOOTH EXTRACTION       Social History     Tobacco Use    Smoking status: Never    Smokeless tobacco: Never   Substance Use Topics    Alcohol use: Never     Prior to Admission medications    Medication Sig Start Date End Date Taking? Authorizing Provider   Hydrocort-Pramoxine, Perianal, (ANALPRAM-HC) 2.5-1 % rectal cream Place rectally 3 times daily 23   Stuart Nava MD   ibuprofen (ADVIL;MOTRIN) 600 MG tablet Take 1 tablet by mouth in the morning and 1 tablet at noon and 1 tablet in the evening and 1 tablet before bedtime. 23   Stuart Nava MD   docusate sodium (COLACE, DULCOLAX) 100 MG CAPS Take 100 mg by mouth 2 times daily 23   Stuart Nava MD   Prenatal MV-Min-Fe Fum-FA-DHA (PRENATAL 1 PO) Take by mouth    ProviderAlix MD     No Known Allergies    Physical Exam:  VITALS:  BP (!) 143/60   Pulse 80   CONSTITUTIONAL:  appears very uncomfortable with contractions  ABDOMEN:

## 2025-04-23 NOTE — ANESTHESIA PRE PROCEDURE
Department of Anesthesiology  Preprocedure Note       Name:  Dominga Alcala   Age:  30 y.o.  :  1995                                          MRN:  [id number]         Date:  2025      Surgeon: * No surgeons listed *    Procedure: * No procedures listed *    Medications prior to admission:   Prior to Admission medications    Medication Sig Start Date End Date Taking? Authorizing Provider   Hydrocort-Pramoxine, Perianal, (ANALPRAM-HC) 2.5-1 % rectal cream Place rectally 3 times daily 23   Stuart Nava MD   ibuprofen (ADVIL;MOTRIN) 600 MG tablet Take 1 tablet by mouth in the morning and 1 tablet at noon and 1 tablet in the evening and 1 tablet before bedtime. 23   Stuart Nava MD   docusate sodium (COLACE, DULCOLAX) 100 MG CAPS Take 100 mg by mouth 2 times daily 23   Stuart Nava MD   Prenatal MV-Min-Fe Fum-FA-DHA (PRENATAL 1 PO) Take by mouth    Provider, MD Alix       Current medications:    Current Facility-Administered Medications   Medication Dose Route Frequency Provider Last Rate Last Admin    terbutaline (BRETHINE) injection 0.25 mg  0.25 mg SubCUTAneous Once PRN Shelia Dale MD        lactated ringers infusion   IntraVENous Continuous Shelia Dale MD        lactated ringers bolus 500 mL  500 mL IntraVENous PRN Shelia Dale MD        Or    lactated ringers bolus 1,000 mL  1,000 mL IntraVENous PRN Shelia Dale MD        sodium chloride flush 0.9 % injection 5-40 mL  5-40 mL IntraVENous 2 times per day Shelia Dale MD        sodium chloride flush 0.9 % injection 5-40 mL  5-40 mL IntraVENous PRN Shelia Dale MD        0.9 % sodium chloride infusion   IntraVENous PRN Shelia Dale MD        butorphanol (STADOL) injection 1 mg  1 mg IntraVENous Q3H PRN Shelia Dale MD        tranexamic acid-NaCl IVPB premix 1,000 mg  1,000 mg IntraVENous Once PRN Shelia Dale MD        acetaminophen (TYLENOL) tablet 650 mg  650 mg Oral Q4H PRN Shelia Dale MD

## 2025-04-23 NOTE — ANESTHESIA PROCEDURE NOTES
Epidural Block    Patient location during procedure: OB  Start time: 4/23/2025 6:22 PM  End time: 4/23/2025 6:23 PM  Reason for block: labor epidural  Staffing  Performed: anesthesiologist   Anesthesiologist: Bernard Steinberg DO  Performed by: Bernard Steinberg DO  Authorized by: Bernard Steinberg DO    Epidural  Patient position: sitting  Prep: ChloraPrep  Patient monitoring: cardiac monitor, continuous pulse ox and frequent blood pressure checks  Approach: midline  Location: L3-4  Injection technique: SHARON saline  Provider prep: mask and sterile gloves  Needle  Needle type: Tuohy   Needle gauge: 17 G  Needle insertion depth: 4 cm  Catheter type: end hole  Catheter size: 19 G  Catheter at skin depth: 9 cm  Test dose: negativeCatheter Secured: tegaderm and tape  Assessment  Hemodynamics: stable  Attempts: 1  Outcomes: uncomplicated and patient tolerated procedure well  Additional Notes  R/B/I of labor epidural discussed.  Pt expresses understanding and is willing to proceed    Timeout performed    In the sitting position, lidocaine used to anesthetize the skin.  Tuohy needle inserted until SHARON to saline occurred.  Catheter threaded without difficulty or paresthesia.  Negative aspiration for CSF/blood.  Pt tolerated the procedure well  Preanesthetic Checklist  Completed: patient identified, IV checked, site marked, risks and benefits discussed, surgical/procedural consents, equipment checked, pre-op evaluation, timeout performed, anesthesia consent given, oxygen available and monitors applied/VS acknowledged

## 2025-04-24 VITALS
RESPIRATION RATE: 17 BRPM | DIASTOLIC BLOOD PRESSURE: 64 MMHG | HEART RATE: 57 BPM | OXYGEN SATURATION: 99 % | TEMPERATURE: 98.4 F | SYSTOLIC BLOOD PRESSURE: 104 MMHG

## 2025-04-24 LAB
BLOOD BANK CMNT PATIENT-IMP: NORMAL
FETAL SCREEN: NORMAL
HGB BLD-MCNC: 10.6 G/DL (ref 11.7–15.4)

## 2025-04-24 PROCEDURE — 1100000000 HC RM PRIVATE

## 2025-04-24 PROCEDURE — 96372 THER/PROPH/DIAG INJ SC/IM: CPT

## 2025-04-24 PROCEDURE — 6370000000 HC RX 637 (ALT 250 FOR IP): Performed by: OBSTETRICS & GYNECOLOGY

## 2025-04-24 PROCEDURE — 2500000003 HC RX 250 WO HCPCS: Performed by: OBSTETRICS & GYNECOLOGY

## 2025-04-24 PROCEDURE — 6360000002 HC RX W HCPCS: Performed by: OBSTETRICS & GYNECOLOGY

## 2025-04-24 PROCEDURE — 85018 HEMOGLOBIN: CPT

## 2025-04-24 PROCEDURE — 85461 HEMOGLOBIN FETAL: CPT

## 2025-04-24 PROCEDURE — 36415 COLL VENOUS BLD VENIPUNCTURE: CPT

## 2025-04-24 RX ORDER — IBUPROFEN 600 MG/1
600 TABLET, FILM COATED ORAL EVERY 6 HOURS PRN
Qty: 40 TABLET | Refills: 0 | Status: SHIPPED | OUTPATIENT
Start: 2025-04-24 | End: 2025-05-04

## 2025-04-24 RX ORDER — PSEUDOEPHEDRINE HCL 30 MG
100 TABLET ORAL 2 TIMES DAILY
Qty: 60 CAPSULE | Refills: 0 | Status: SHIPPED | OUTPATIENT
Start: 2025-04-24 | End: 2025-05-24

## 2025-04-24 RX ADMIN — IBUPROFEN 600 MG: 600 TABLET, FILM COATED ORAL at 07:41

## 2025-04-24 RX ADMIN — DOCUSATE SODIUM 100 MG: 100 CAPSULE, LIQUID FILLED ORAL at 07:41

## 2025-04-24 RX ADMIN — SODIUM CHLORIDE, PRESERVATIVE FREE 10 ML: 5 INJECTION INTRAVENOUS at 07:50

## 2025-04-24 RX ADMIN — ACETAMINOPHEN 1000 MG: 500 TABLET, FILM COATED ORAL at 04:16

## 2025-04-24 RX ADMIN — ACETAMINOPHEN 1000 MG: 500 TABLET, FILM COATED ORAL at 16:25

## 2025-04-24 RX ADMIN — ACETAMINOPHEN 1000 MG: 500 TABLET, FILM COATED ORAL at 10:52

## 2025-04-24 RX ADMIN — Medication: at 00:34

## 2025-04-24 RX ADMIN — IBUPROFEN 600 MG: 600 TABLET, FILM COATED ORAL at 19:46

## 2025-04-24 RX ADMIN — HYDROCORTISONE ACETATE PRAMOXINE HCL: 2.5; 1 CREAM TOPICAL at 20:03

## 2025-04-24 RX ADMIN — ACETAMINOPHEN 1000 MG: 500 TABLET, FILM COATED ORAL at 23:09

## 2025-04-24 RX ADMIN — IBUPROFEN 600 MG: 600 TABLET, FILM COATED ORAL at 13:39

## 2025-04-24 RX ADMIN — DOCUSATE SODIUM 100 MG: 100 CAPSULE, LIQUID FILLED ORAL at 19:46

## 2025-04-24 RX ADMIN — MAGNESIUM SULFATE 1 G: 1 CRYSTAL ORAL; TOPICAL at 23:08

## 2025-04-24 RX ADMIN — MAGNESIUM SULFATE 1 G: 1 CRYSTAL ORAL; TOPICAL at 09:42

## 2025-04-24 RX ADMIN — IBUPROFEN 600 MG: 600 TABLET, FILM COATED ORAL at 00:34

## 2025-04-24 RX ADMIN — RHO(D) IMMUNE GLOBULIN (HUMAN) 300 MCG: 1500 SOLUTION INTRAMUSCULAR at 10:53

## 2025-04-24 ASSESSMENT — PAIN DESCRIPTION - ORIENTATION: ORIENTATION: MID;LOWER

## 2025-04-24 ASSESSMENT — PAIN SCALES - GENERAL
PAINLEVEL_OUTOF10: 2
PAINLEVEL_OUTOF10: 3

## 2025-04-24 ASSESSMENT — PAIN DESCRIPTION - DESCRIPTORS: DESCRIPTORS: ACHING;CRAMPING

## 2025-04-24 ASSESSMENT — PAIN DESCRIPTION - LOCATION: LOCATION: ABDOMEN;PERINEUM

## 2025-04-24 NOTE — PROGRESS NOTES
Shift assessment complete see flowsheet. Pt requesting to be d/c today. Bleeding precautions given. Pt to call with needs/concerns.

## 2025-04-24 NOTE — L&D DELIVERY NOTE
Delivery of Hamzah Richardson, weight 8-1.5    Bean Alcala [[id number]]      Labor Events     Labor: No   Steroids: None  Cervical Ripening Date/Time:      Antibiotics Received during Labor: No  Rupture Date/Time:  25 19:51:00   Rupture Type: AROM  Fluid Color: Pink  Fluid Odor: None  Fluid Volume: Moderate  Induction: None  Augmentation: AROM  Labor Complications: None       Anesthesia    Method: Epidural       Labor Event Times      Labor onset date/time:  25 13:00:00     Dilation complete date/time:  25 19:51:00     Start pushing date/time:  2025 19:55:00   Decision date/time (emergent ):            Labor Length    1st stage: 6h 51m  2nd stage: 1h 36m  3rd stage: 0h 06m       Delivery Details      Delivery Date: 25 Delivery Time: 21:27:40   Delivery Type: Vaginal, Spontaneous              [place] Presentation    Presentation: Vertex  Position: Left  _: Occiput  _: Anterior       Shoulder Dystocia    Shoulder Dystocia Present?: No       Assisted Delivery Details    Forceps Attempted?: No  Vacuum Extractor Attempted?: No                           Cord    Vessels: 3 Vessels  Complications: None  Delayed Cord Clamping?: Yes  Cord Clamped Date/Time: 2025 21:28:00  Cord Blood Disposition: Lab  Gases Sent?: No              Placenta    Date/Time: 2025 21:34:00  Removal: Spontaneous  Appearance: Intact  Disposition: Discarded       Lacerations    Episiotomy: None  Perineal Lacerations: 2nd  Number of Repair Packets: 2       Vaginal Counts    Initial Count Personnel: MAYNOR PHAM  Initial Count Verified By: MIKALA SMALLWOOD  Intial Sponge Count: Correct Intial Needles Count: Correct Intial Instruments Count: Correct   Final Sponges Count: Correct Final Needles  Count: Correct Final Instruments Count: Correct   Final Count Personnel: MAYNOR PHAM  Final Count Verified By: MIKALA SMALLWOOD  Accurate Final Count?: Yes       Blood Loss  Mother: Dominga Alcala #[id number]      Start of Mother's Information      Delivery Blood Loss   Intrapartum & Postpartum: 25 1300 - 25    Delivery Admission: 25 1300 - 25         Intrapartum & Postpartum Delivery Admission    Quantitative Blood Loss (mL) Hospital Encounter 225 grams 225 grams    Total  225 mL 225 mL               End of Mother's Information  Mother: Dominga Alcala #[id number]                Delivery Providers    Delivering clinician: Stuart Nava MD     Provider Role    Stuart Nava MD Obstetrician    Leslie Carvalho, CRISTIAN Primary Nurse    Leslie Carvalho, RN Primary [place] Nurse    Deena Grullon Megan A, RN Charge Nurse              [place] Assessment    Living Status: Living  Delivery Location Comment: [phone]        Skin Color:   Heart Rate:   Reflex Irritability:   Muscle Tone:   Respiratory Effort:   Total:            1 Minute:    0    2    2    2    2    8         5 Minute:    1    2    2    2    2    9                                        Apgars Assigned By: YANCI DOWNING RN              Resuscitation    Method: Bulb Suction, Room Air, Stimulation              Measurements      Birth Weight: 3670 g   Birth Length: 52.7 cm     Head Circumference: 35 cm     Chest Circumference: 33.5 cm              Skin to Skin      Skin to Skin Initiation Date/Time: 25 21:43:21 EDT     Skin to Skin With: Mother                  Pt. C/C/+1, yet ROP, with pushing to LOP, delayed pushing with position change, than C/C/+3 with ANDRES, and control delivery, anterior shoulder reduced with gental traction, bulb suction, rest of infant delivered, onto Mom, delayed cord clamping, placenta intact UE clear,  2nd degree tear and repair with 3-0 rapid and 2-0 vicryl.  RV okay except large hemorrhoids

## 2025-04-24 NOTE — LACTATION NOTE
This note was copied from a baby's chart.  In to see mom and infant for first time and possible early discharge at 24 hrs tonight per mom's request. Mom states she just finished breastfeeding infant and infant asleep on mom's lap content. Mom states she feels confident baby is latching and feeding well and has no concerns, needs or questions. She declined needing any assistance w/ breastfeeding today. Reviewed discharge info and how to manage period of engorgement. Visitor at bedside. No further needs.

## 2025-04-24 NOTE — ANESTHESIA POSTPROCEDURE EVALUATION
Department of Anesthesiology  Postprocedure Note    Patient: Dominga Alcala  MRN: [id number]  YOB: 1995  Date of evaluation: 4/24/2025    Procedure Summary       Date: 04/23/25 Room / Location:     Anesthesia Start: 1814 Anesthesia Stop: 2127    Procedure: Labor Analgesia Diagnosis:     Scheduled Providers:  Responsible Provider: Bernard Steinberg DO    Anesthesia Type: epidural ASA Status: 2            Anesthesia Type: No value filed.    [place] Phase I:      [place] Phase II:      Anesthesia Post Evaluation    Patient location during evaluation: floor  Patient participation: complete - patient participated  Level of consciousness: awake and alert  Airway patency: patent  Nausea & Vomiting: no nausea and no vomiting  Cardiovascular status: hemodynamically stable  Respiratory status: acceptable  Hydration status: euvolemic  Comments: Numbness/tingling in lower extremities resolved.  Up to bathroom.  Satisfied with labor epidural for labor analgesia      Pain management: satisfactory to patient    No notable events documented.

## 2025-04-24 NOTE — PROGRESS NOTES
Progress Note                               Patient: Dominga Alcala MRN: [id number]  SSN: [id number]    YOB: 1995  Age: 30 y.o.  Sex: female            Subjective:     Doing well this morning. Pain is controlled. Got some rest last night. Breastfeeding overall going well. Denies heavy bleeding. No other concerns.     Objective:     Patient Vitals for the past 18 hrs:   Temp Pulse Resp BP SpO2   04/24/25 0659 98.1 °F (36.7 °C) 62 16 109/79 98 %   04/24/25 0408 97.9 °F (36.6 °C) 67 17 100/72 98 %   04/24/25 0017 97.7 °F (36.5 °C) 64 16 113/74 99 %   04/23/25 2334 98.3 °F (36.8 °C) 63 18 107/66 100 %   04/23/25 2319 98.4 °F (36.9 °C) 75 -- 105/62 100 %   04/23/25 2312 -- -- 17 -- --   04/23/25 2304 -- (!) 101 -- (!) 113/59 --   04/23/25 2249 -- 68 -- 112/67 --   04/23/25 2242 -- -- 17 -- --   04/23/25 2234 98.1 °F (36.7 °C) 77 16 107/66 --   04/23/25 2204 -- 97 -- (!) 94/57 --   04/23/25 2149 -- 76 -- (!) 107/57 --   04/23/25 2135 -- 80 -- (!) 103/53 --   04/23/25 2120 -- 91 -- 112/72 --   04/23/25 2104 98 °F (36.7 °C) 76 18 (!) 105/55 100 %   04/23/25 2051 -- 95 -- (!) 115/56 --   04/23/25 2035 -- 71 -- (!) 98/54 --   04/23/25 2021 -- 90 -- 117/68 --   04/23/25 2005 -- (!) 101 -- 120/67 --   04/23/25 1950 -- (!) 103 -- 108/65 --   04/23/25 1935 -- 90 -- 120/67 --   04/23/25 1922 -- 85 -- 113/69 --   04/23/25 1914 97.9 °F (36.6 °C) 82 17 (!) 113/56 100 %   04/23/25 1910 -- 81 -- (!) 115/58 --   04/23/25 1903 -- 68 -- (!) 110/53 --   04/23/25 1901 -- 67 -- (!) 108/52 --   04/23/25 1859 -- 84 -- (!) 103/51 --   04/23/25 1858 -- 70 -- (!) 108/56 --   04/23/25 1856 -- 75 -- (!) 110/56 --   04/23/25 1854 -- 72 -- (!) 121/57 --   04/23/25 1853 -- 68 -- (!) 113/56 --   04/23/25 1852 -- 71 -- (!) 108/56 --   04/23/25 1851 -- 65 -- (!) 105/58 --   04/23/25 1850 -- 67 -- (!) 107/56 --   04/23/25 1848 -- 66 -- 104/61 --   04/23/25 1847 -- 67 -- (!) 113/57 --   04/23/25 1846 -- 70 -- (!) 109/56 --   04/23/25 1845 --

## 2025-04-24 NOTE — DISCHARGE SUMMARY
Obstetrical Discharge Summary     Name: Dominga Alcala MRN: [id number]  SSN: [id number]    YOB: 1995  Age: 30 y.o.  Sex: female      Allergies: Patient has no known allergies.    Admit Date: 2025    Discharge Date: 2025     Admitting Physician: Shelia Dale MD     Attending Physician:  Stuart Nava MD     * Admission Diagnoses: Normal labor [O80, Z37.9]    * Discharge Diagnoses:   Postpartum period from a     Additional Diagnoses:    Lab Results   Component Value Date/Time    ABORH B NEGATIVE 2025 06:14 PM      Immunization History   Administered Date(s) Administered    Influenza Virus Vaccine 2018    Influenza, FLUCELVAX, (age 6 mo+), MDCK, Quadv PF, 0.5mL 2019, 2022    PPD Test 2021, 2021    TDaP, ADACEL (age 10y-64y), BOOSTRIX (age 10y+), IM, 0.5mL 2023       * Procedures:     * Discharge Condition: Good    * Hospital Course: Normal hospital course following the delivery. She is meeting all goals of discharge on PPD 1. Will discharge home today.     * Disposition: Home    Discharge Medications:      Medication List        ASK your doctor about these medications      docusate 100 MG Caps  Commonly known as: COLACE, DULCOLAX  Take 100 mg by mouth 2 times daily     Hydrocort-Pramoxine (Perianal) 2.5-1 % rectal cream  Commonly known as: ANALPRAM-HC  Place rectally 3 times daily     ibuprofen 600 MG tablet  Commonly known as: ADVIL;MOTRIN  Take 1 tablet by mouth in the morning and 1 tablet at noon and 1 tablet in the evening and 1 tablet before bedtime.     PRENATAL 1 PO              * Follow-up Care/Patient Instructions:  Activity: activity as tolerated  Diet: regular diet  Wound Care: keep wound clean and dry

## 2025-04-25 NOTE — DISCHARGE INSTRUCTIONS
Discharge instruction to follow:   Activity: Pelvis rest for 6 weeks     No heavy lifting over 15 lbs for 2 weeks     No driving for 2 weeks     No push/pull motion such as sweeping or vacuuming for 2 weeks     No tub baths for 6 weeks    Continue using the hygenique wand after each void or bowel movement.  If using sitz bath continue until comfortable stopping.  If using reece-bottle continue to use until comfortable stopping.  Change sanitary pad after each urination or bowel movement.    Call MD for the following:      Fever over 101 F; pain not relieved by medication; foul smelling vaginal discharge or an increase in vaginal bleeding.       Take medication as prescribed. Follow up with MD as order.        After Your Delivery (the Postpartum Period): Care Instructions  Overview     After childbirth (postpartum period), your body goes through many changes as you recover. In these weeks after delivery, try to take good care of yourself. Get rest whenever you can and accept help from others.  It may take 4 to 6 weeks to feel like yourself again, and possibly longer if you had a  birth. You may feel sore or very tired as you recover. After delivery, you may continue to have contractions as the uterus returns to the size it was before your pregnancy. You will also have some vaginal bleeding. And you may have pain around the vagina as you heal. Several days after delivery you may also have pain and swelling in your breasts as they fill with milk. There are things you can do at home to help ease these discomforts.  After childbirth, it's common to feel emotional. You may feel irritable, cry easily, and feel happy one minute and sad the next. This is called the \"baby blues.\" Hormone changes are one cause of these emotional changes. These feelings usually get better within a couple of weeks. If they don't, talk to your doctor or midwife.  In the first couple of weeks after you give birth, your doctor or midwife may